# Patient Record
Sex: FEMALE | Race: OTHER | Employment: FULL TIME | URBAN - METROPOLITAN AREA
[De-identification: names, ages, dates, MRNs, and addresses within clinical notes are randomized per-mention and may not be internally consistent; named-entity substitution may affect disease eponyms.]

---

## 2024-10-24 ENCOUNTER — APPOINTMENT (OUTPATIENT)
Dept: GENERAL RADIOLOGY | Age: 24
End: 2024-10-24

## 2024-10-24 ENCOUNTER — HOSPITAL ENCOUNTER (EMERGENCY)
Age: 24
Discharge: HOME | End: 2024-10-24

## 2024-10-24 VITALS
HEART RATE: 65 BPM | OXYGEN SATURATION: 100 % | WEIGHT: 180 LBS | TEMPERATURE: 98.4 F | HEIGHT: 61 IN | BODY MASS INDEX: 33.99 KG/M2 | DIASTOLIC BLOOD PRESSURE: 85 MMHG | SYSTOLIC BLOOD PRESSURE: 129 MMHG | RESPIRATION RATE: 17 BRPM

## 2024-10-24 DIAGNOSIS — H65.03 BILATERAL ACUTE SEROUS OTITIS MEDIA, RECURRENCE NOT SPECIFIED: ICD-10-CM

## 2024-10-24 DIAGNOSIS — J06.9 VIRAL URI WITH COUGH: Primary | ICD-10-CM

## 2024-10-24 DIAGNOSIS — Z87.09 HISTORY OF ASTHMA: ICD-10-CM

## 2024-10-24 LAB
FLUAV RNA RESP QL NAA+PROBE: NOT DETECTED
FLUBV RNA RESP QL NAA+PROBE: NOT DETECTED
S PYO AG THROAT QL: NEGATIVE
SARS-COV-2 RNA RESP QL NAA+PROBE: NOT DETECTED

## 2024-10-24 PROCEDURE — 71045 X-RAY EXAM CHEST 1 VIEW: CPT

## 2024-10-24 PROCEDURE — 87636 SARSCOV2 & INF A&B AMP PRB: CPT

## 2024-10-24 PROCEDURE — 87880 STREP A ASSAY W/OPTIC: CPT

## 2024-10-24 PROCEDURE — 6360000002 HC RX W HCPCS: Performed by: PHYSICIAN ASSISTANT

## 2024-10-24 PROCEDURE — 99284 EMERGENCY DEPT VISIT MOD MDM: CPT

## 2024-10-24 RX ORDER — ALBUTEROL SULFATE 90 UG/1
2 INHALANT RESPIRATORY (INHALATION) 4 TIMES DAILY
Qty: 18 G | Refills: 3 | Status: SHIPPED | OUTPATIENT
Start: 2024-10-24 | End: 2024-11-03

## 2024-10-24 RX ORDER — ALBUTEROL SULFATE 0.83 MG/ML
2.5 SOLUTION RESPIRATORY (INHALATION) ONCE
Status: COMPLETED | OUTPATIENT
Start: 2024-10-24 | End: 2024-10-24

## 2024-10-24 RX ORDER — PREDNISONE 20 MG/1
60 TABLET ORAL DAILY
Qty: 15 TABLET | Refills: 0 | Status: SHIPPED | OUTPATIENT
Start: 2024-10-24 | End: 2024-10-29

## 2024-10-24 RX ADMIN — ALBUTEROL SULFATE 2.5 MG: 2.5 SOLUTION RESPIRATORY (INHALATION) at 15:55

## 2024-10-24 ASSESSMENT — LIFESTYLE VARIABLES
HOW OFTEN DO YOU HAVE A DRINK CONTAINING ALCOHOL: NEVER
HOW MANY STANDARD DRINKS CONTAINING ALCOHOL DO YOU HAVE ON A TYPICAL DAY: PATIENT DOES NOT DRINK

## 2024-10-24 NOTE — ED PROVIDER NOTES
[facility] ED  EMERGENCY DEPARTMENT ENCOUNTER        Pt Name: Margot Gonzalez  MRN: [id number]  Birthdate 2000  Date of evaluation: 10/24/2024  Provider: Hansel Lazo PA-C  PCP: No primary care provider on file.  Note Started: 3:07 PM EDT 10/24/24      SAMI. I have evaluated this patient.        CHIEF COMPLAINT       Chief Complaint   Patient presents with    Cough     For over a week, getting worse last night. Has been taking otc mucinex without relief.        HISTORY OF PRESENT ILLNESS: 1 or more Elements     History From: Patient    Limitations to history : None    Social Determinants Significantly Affecting Health : None    Chief Complaint: Cough    Margot Gonzalez is a 24 y.o. female who presents to the ED for emergent evaluation of cough x 8 to 10 days that has not resolved with over-the-counter Mucinex and sporadic uses of unknown inhaler.  Last night, patient awoke after an extended coughing fit and her chest hurt when she was breathing.  She has not spoken with primary care physician about this.  She denies fever but admits to congestion.  Food has normal taste.  She denies rash, nausea, vomiting, neck pain or stiffness, palpitations or difficulty breathing, back pain, abdominal pain, paresthesias, change in bowels or urine, epistaxis, discharge from eyes or ears, change in hearing or vision, throat pain, or any additional systemic or neurologic complaints.    Nursing Notes were all reviewed and agreed with or any disagreements were addressed in the HPI.    REVIEW OF SYSTEMS :      Review of Systems    Positives and Pertinent negatives as per HPI.     SURGICAL HISTORY   History reviewed. No pertinent surgical history.    CURRENTMEDICATIONS       Discharge Medication List as of 10/24/2024  4:13 PM          ALLERGIES     Patient has no known allergies.    FAMILYHISTORY     History reviewed. No pertinent family history.     SOCIAL HISTORY       Social History     Tobacco Use

## 2024-10-24 NOTE — DISCHARGE INSTRUCTIONS
Treat symptomatically with over-the-counter medications-take over-the-counter Sudafed along with your choice of antihistamines-Benadryl, Claritin, Tavist, Allegra, or Zyrtec.  Take these as per package instructions for as long as symptoms continue.  You may continue with Mucinex if you choose.  Over-the-counter Delsym or similar will help with cough.  The antihistamines and Sudafed will also help.  Use inhaler every 6 hours as prescribed.  Take steroids as prescribed.  Follow-up with listed primary care group or primary care physician of your choice.  Within the next 5 days.  Call as soon as you get home to set up an appointment.